# Patient Record
(demographics unavailable — no encounter records)

---

## 2025-07-21 NOTE — HISTORY OF PRESENT ILLNESS
[FreeTextEntry1] : Ms. OZUNA is a 23 year old male self referred who presents for hyperhidrosis evaluation.   Her past medical history includes asthma and color blindness.    He has been evaluated by Dermatology for approximately 1 year and has sought second opinions. He was treated with Cubrexo Wipes for his hands and feet as well as a pill that he was taking. When he was taking it there was no noticeable difference in his sweating of his hands and feet. 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 He feels that his earliest memory of his hyperhidrosis was in middle school and has not gotten better.

## 2025-07-21 NOTE — PHYSICAL EXAM
[General Appearance - Alert] : alert [General Appearance - In No Acute Distress] : in no acute distress [] : no respiratory distress [Auscultation Breath Sounds / Voice Sounds] : lungs were clear to auscultation bilaterally [Heart Rate And Rhythm] : heart rate was normal and rhythm regular [Heart Sounds] : normal S1 and S2 [Heart Sounds Gallop] : no gallops [Murmurs] : no murmurs [Heart Sounds Pericardial Friction Rub] : no pericardial rub [Examination Of The Chest] : the chest was normal in appearance [Chest Visual Inspection Thoracic Asymmetry] : no chest asymmetry [Diminished Respiratory Excursion] : normal chest expansion

## 2025-07-21 NOTE — ASSESSMENT
[FreeTextEntry1] : Dania is a 23-year-old male with a history of and foot sweating consistent with hyperhidrosis.  He was evaluated by dermatology but it does not appear a formal evaluation and treatment plan was provided.  I have asked him to obtain a formal dermatology evaluation and attempt medical management at this point as the risk of compensatory sweating is significant and may cause significant debilitation going forward.  Thank you for allowing me to participate in the care of your patient.  45 minutes was spent during this encounter.  Juan Rios MD Department of Cardiovascular and Thoracic Surgery  [employer] at [facility]